# Patient Record
Sex: FEMALE | Race: BLACK OR AFRICAN AMERICAN
[De-identification: names, ages, dates, MRNs, and addresses within clinical notes are randomized per-mention and may not be internally consistent; named-entity substitution may affect disease eponyms.]

---

## 2019-02-06 ENCOUNTER — HOSPITAL ENCOUNTER (EMERGENCY)
Dept: HOSPITAL 12 - ER | Age: 54
Discharge: HOME | End: 2019-02-06
Payer: COMMERCIAL

## 2019-02-06 VITALS — BODY MASS INDEX: 27.6 KG/M2 | HEIGHT: 62 IN | WEIGHT: 150 LBS

## 2019-02-06 VITALS — SYSTOLIC BLOOD PRESSURE: 156 MMHG | DIASTOLIC BLOOD PRESSURE: 89 MMHG

## 2019-02-06 DIAGNOSIS — S80.02XA: Primary | ICD-10-CM
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
## 2019-02-06 DIAGNOSIS — S80.12XA: ICD-10-CM
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
## 2019-02-06 DIAGNOSIS — I10: ICD-10-CM
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
## 2019-02-06 PROCEDURE — A4663 DIALYSIS BLOOD PRESSURE CUFF: HCPCS

## 2019-02-06 NOTE — NUR
PATIENT WAS SEEN BY MD FOR C/O KNEE PAIN AFTER FALL.   XRAYS DONE.  ACE WRAP 
APPLIED TO KNEE.  DC AND FOLLOW UP INSTRUCTIONS GIVEN AND EXPLAINED TO PATIENT 
WHO STATES SHE UNDERSTANDS ALL INSTRUCTIONS.